# Patient Record
Sex: FEMALE | Race: WHITE | NOT HISPANIC OR LATINO | Employment: FULL TIME | URBAN - METROPOLITAN AREA
[De-identification: names, ages, dates, MRNs, and addresses within clinical notes are randomized per-mention and may not be internally consistent; named-entity substitution may affect disease eponyms.]

---

## 2022-05-25 LAB
ABO, EXTERNAL RESULT: NORMAL
HEP B, EXTERNAL RESULT: NEGATIVE
HEPATITIS C ANTIBODY, EXTERNAL RESULT: NEGATIVE
HIV, EXTERNAL RESULT: NON REACTIVE
RH FACTOR, EXTERNAL RESULT: POSITIVE
RPR, EXTERNAL RESULT: NON REACTIVE
RUBELLA TITER, EXTERNAL RESULT: NORMAL

## 2022-06-20 LAB
C. TRACHOMATIS, EXTERNAL RESULT: NOT DETECTED
N. GONORRHOEAE, EXTERNAL RESULT: NOT DETECTED

## 2022-08-23 ENCOUNTER — HOSPITAL ENCOUNTER (EMERGENCY)
Facility: HOSPITAL | Age: 27
Discharge: HOME | End: 2022-08-23
Attending: EMERGENCY MEDICINE | Admitting: EMERGENCY MEDICINE

## 2022-08-23 ENCOUNTER — HOSPITAL ENCOUNTER (OUTPATIENT)
Facility: HOSPITAL | Age: 27
End: 2022-08-23
Attending: OBSTETRICS & GYNECOLOGY | Admitting: OBSTETRICS & GYNECOLOGY

## 2022-08-23 ENCOUNTER — APPOINTMENT (OUTPATIENT)
Dept: ULTRASOUND IMAGING | Facility: HOSPITAL | Age: 27
End: 2022-08-23

## 2022-08-23 ENCOUNTER — HOSPITAL ENCOUNTER (OUTPATIENT)
Facility: HOSPITAL | Age: 27
Discharge: HOME | End: 2022-08-23
Attending: OBSTETRICS & GYNECOLOGY | Admitting: OBSTETRICS & GYNECOLOGY

## 2022-08-23 ENCOUNTER — HOSPITAL ENCOUNTER (EMERGENCY)
Facility: HOSPITAL | Age: 27
End: 2022-08-23

## 2022-08-23 VITALS
DIASTOLIC BLOOD PRESSURE: 71 MMHG | OXYGEN SATURATION: 97 % | SYSTOLIC BLOOD PRESSURE: 112 MMHG | BODY MASS INDEX: 23.22 KG/M2 | HEART RATE: 106 BPM | WEIGHT: 123 LBS | RESPIRATION RATE: 18 BRPM | HEIGHT: 61 IN | TEMPERATURE: 98 F

## 2022-08-23 VITALS
OXYGEN SATURATION: 98 % | HEART RATE: 108 BPM | BODY MASS INDEX: 23.35 KG/M2 | WEIGHT: 123.68 LBS | TEMPERATURE: 98.1 F | HEIGHT: 61 IN | RESPIRATION RATE: 20 BRPM

## 2022-08-23 VITALS
TEMPERATURE: 97.8 F | DIASTOLIC BLOOD PRESSURE: 75 MMHG | SYSTOLIC BLOOD PRESSURE: 129 MMHG | RESPIRATION RATE: 20 BRPM | OXYGEN SATURATION: 99 % | HEART RATE: 101 BPM

## 2022-08-23 DIAGNOSIS — R11.2 NAUSEA AND VOMITING, UNSPECIFIED VOMITING TYPE: ICD-10-CM

## 2022-08-23 DIAGNOSIS — N39.0 URINARY TRACT INFECTION WITHOUT HEMATURIA, SITE UNSPECIFIED: ICD-10-CM

## 2022-08-23 DIAGNOSIS — R51.9 ACUTE INTRACTABLE HEADACHE, UNSPECIFIED HEADACHE TYPE: ICD-10-CM

## 2022-08-23 DIAGNOSIS — Z3A.21 21 WEEKS GESTATION OF PREGNANCY: Primary | ICD-10-CM

## 2022-08-23 LAB
ALBUMIN SERPL-MCNC: 3.2 G/DL (ref 3.5–5.2)
ALBUMIN/GLOB SERPL: 1.1 G/DL
ALP SERPL-CCNC: 81 U/L (ref 39–117)
ALT SERPL W P-5'-P-CCNC: 6 U/L (ref 1–33)
ANION GAP SERPL CALCULATED.3IONS-SCNC: 9 MMOL/L (ref 5–15)
AST SERPL-CCNC: 11 U/L (ref 1–32)
BACTERIA UR QL AUTO: ABNORMAL /HPF
BASOPHILS # BLD AUTO: 0 10*3/MM3 (ref 0–0.2)
BASOPHILS NFR BLD AUTO: 0.3 % (ref 0–1.5)
BILIRUB SERPL-MCNC: 0.2 MG/DL (ref 0–1.2)
BILIRUB UR QL STRIP: NEGATIVE
BUN SERPL-MCNC: 13 MG/DL (ref 6–20)
BUN/CREAT SERPL: 21 (ref 7–25)
CALCIUM SPEC-SCNC: 8.5 MG/DL (ref 8.6–10.5)
CHLORIDE SERPL-SCNC: 98 MMOL/L (ref 98–107)
CLARITY UR: ABNORMAL
CO2 SERPL-SCNC: 22 MMOL/L (ref 22–29)
COLOR UR: YELLOW
CREAT SERPL-MCNC: 0.62 MG/DL (ref 0.57–1)
D-LACTATE SERPL-SCNC: 0.6 MMOL/L (ref 0.5–2)
DEPRECATED RDW RBC AUTO: 42 FL (ref 37–54)
EGFRCR SERPLBLD CKD-EPI 2021: 124.6 ML/MIN/1.73
EOSINOPHIL # BLD AUTO: 0.1 10*3/MM3 (ref 0–0.4)
EOSINOPHIL NFR BLD AUTO: 0.7 % (ref 0.3–6.2)
ERYTHROCYTE [DISTWIDTH] IN BLOOD BY AUTOMATED COUNT: 16.3 % (ref 12.3–15.4)
GLOBULIN UR ELPH-MCNC: 2.8 GM/DL
GLUCOSE BLDC GLUCOMTR-MCNC: 229 MG/DL (ref 70–105)
GLUCOSE SERPL-MCNC: 248 MG/DL (ref 65–99)
GLUCOSE UR STRIP-MCNC: ABNORMAL MG/DL
HCT VFR BLD AUTO: 24.6 % (ref 34–46.6)
HGB BLD-MCNC: 7.7 G/DL (ref 12–15.9)
HGB UR QL STRIP.AUTO: NEGATIVE
HYALINE CASTS UR QL AUTO: ABNORMAL /LPF
KETONES UR QL STRIP: ABNORMAL
LEUKOCYTE ESTERASE UR QL STRIP.AUTO: ABNORMAL
LYMPHOCYTES # BLD AUTO: 1.1 10*3/MM3 (ref 0.7–3.1)
LYMPHOCYTES NFR BLD AUTO: 9.7 % (ref 19.6–45.3)
MCH RBC QN AUTO: 23.1 PG (ref 26.6–33)
MCHC RBC AUTO-ENTMCNC: 31.1 G/DL (ref 31.5–35.7)
MCV RBC AUTO: 74.1 FL (ref 79–97)
MONOCYTES # BLD AUTO: 1.1 10*3/MM3 (ref 0.1–0.9)
MONOCYTES NFR BLD AUTO: 10.3 % (ref 5–12)
NEUTROPHILS NFR BLD AUTO: 79 % (ref 42.7–76)
NEUTROPHILS NFR BLD AUTO: 8.8 10*3/MM3 (ref 1.7–7)
NITRITE UR QL STRIP: NEGATIVE
NRBC BLD AUTO-RTO: 0.1 /100 WBC (ref 0–0.2)
PH UR STRIP.AUTO: 6.5 [PH] (ref 5–8)
PLATELET # BLD AUTO: 274 10*3/MM3 (ref 140–450)
PMV BLD AUTO: 7.8 FL (ref 6–12)
POTASSIUM SERPL-SCNC: 4.7 MMOL/L (ref 3.5–5.2)
PROT SERPL-MCNC: 6 G/DL (ref 6–8.5)
PROT UR QL STRIP: ABNORMAL
RBC # BLD AUTO: 3.32 10*6/MM3 (ref 3.77–5.28)
RBC # UR STRIP: ABNORMAL /HPF
REF LAB TEST METHOD: ABNORMAL
SARS-COV-2 RNA PNL SPEC NAA+PROBE: NOT DETECTED
SODIUM SERPL-SCNC: 129 MMOL/L (ref 136–145)
SP GR UR STRIP: 1.02 (ref 1–1.03)
SQUAMOUS #/AREA URNS HPF: ABNORMAL /HPF
UROBILINOGEN UR QL STRIP: ABNORMAL
WBC # UR STRIP: ABNORMAL /HPF
WBC NRBC COR # BLD: 11.1 10*3/MM3 (ref 3.4–10.8)

## 2022-08-23 PROCEDURE — 87635 SARS-COV-2 COVID-19 AMP PRB: CPT | Performed by: EMERGENCY MEDICINE

## 2022-08-23 PROCEDURE — 99284 EMERGENCY DEPT VISIT MOD MDM: CPT

## 2022-08-23 PROCEDURE — 87040 BLOOD CULTURE FOR BACTERIA: CPT | Performed by: EMERGENCY MEDICINE

## 2022-08-23 PROCEDURE — 36415 COLL VENOUS BLD VENIPUNCTURE: CPT | Performed by: EMERGENCY MEDICINE

## 2022-08-23 PROCEDURE — 25010000002 CEFTRIAXONE PER 250 MG: Performed by: EMERGENCY MEDICINE

## 2022-08-23 PROCEDURE — 76775 US EXAM ABDO BACK WALL LIM: CPT

## 2022-08-23 PROCEDURE — 25010000002 PROCHLORPERAZINE 10 MG/2ML SOLUTION: Performed by: EMERGENCY MEDICINE

## 2022-08-23 PROCEDURE — 85025 COMPLETE CBC W/AUTO DIFF WBC: CPT | Performed by: EMERGENCY MEDICINE

## 2022-08-23 PROCEDURE — 81001 URINALYSIS AUTO W/SCOPE: CPT | Performed by: EMERGENCY MEDICINE

## 2022-08-23 PROCEDURE — 96365 THER/PROPH/DIAG IV INF INIT: CPT

## 2022-08-23 PROCEDURE — 80053 COMPREHEN METABOLIC PANEL: CPT | Performed by: EMERGENCY MEDICINE

## 2022-08-23 PROCEDURE — 83605 ASSAY OF LACTIC ACID: CPT

## 2022-08-23 PROCEDURE — 25010000002 DIPHENHYDRAMINE PER 50 MG: Performed by: EMERGENCY MEDICINE

## 2022-08-23 PROCEDURE — 82962 GLUCOSE BLOOD TEST: CPT

## 2022-08-23 PROCEDURE — 87086 URINE CULTURE/COLONY COUNT: CPT | Performed by: EMERGENCY MEDICINE

## 2022-08-23 PROCEDURE — 96375 TX/PRO/DX INJ NEW DRUG ADDON: CPT

## 2022-08-23 PROCEDURE — 99283 EMERGENCY DEPT VISIT LOW MDM: CPT

## 2022-08-23 PROCEDURE — G0463 HOSPITAL OUTPT CLINIC VISIT: HCPCS

## 2022-08-23 RX ORDER — PROCHLORPERAZINE EDISYLATE 5 MG/ML
10 INJECTION INTRAMUSCULAR; INTRAVENOUS ONCE
Status: COMPLETED | OUTPATIENT
Start: 2022-08-23 | End: 2022-08-23

## 2022-08-23 RX ORDER — CEFDINIR 300 MG/1
300 CAPSULE ORAL 2 TIMES DAILY
Qty: 14 CAPSULE | Refills: 0 | Status: SHIPPED | OUTPATIENT
Start: 2022-08-23

## 2022-08-23 RX ORDER — DIPHENHYDRAMINE HYDROCHLORIDE 50 MG/ML
25 INJECTION INTRAMUSCULAR; INTRAVENOUS ONCE
Status: COMPLETED | OUTPATIENT
Start: 2022-08-23 | End: 2022-08-23

## 2022-08-23 RX ORDER — ONDANSETRON 4 MG/1
4 TABLET, FILM COATED ORAL 4 TIMES DAILY
Qty: 10 TABLET | Refills: 0 | Status: SHIPPED | OUTPATIENT
Start: 2022-08-23

## 2022-08-23 RX ADMIN — CEFTRIAXONE 1 G: 1 INJECTION, POWDER, FOR SOLUTION INTRAMUSCULAR; INTRAVENOUS at 18:42

## 2022-08-23 RX ADMIN — SODIUM CHLORIDE, POTASSIUM CHLORIDE, SODIUM LACTATE AND CALCIUM CHLORIDE 1000 ML: 600; 310; 30; 20 INJECTION, SOLUTION INTRAVENOUS at 15:24

## 2022-08-23 RX ADMIN — PROCHLORPERAZINE EDISYLATE 10 MG: 5 INJECTION INTRAMUSCULAR; INTRAVENOUS at 15:19

## 2022-08-23 RX ADMIN — DIPHENHYDRAMINE HYDROCHLORIDE 25 MG: 50 INJECTION, SOLUTION INTRAMUSCULAR; INTRAVENOUS at 15:17

## 2022-08-23 NOTE — DISCHARGE INSTRUCTIONS
Follow-up with your obstetrician call in a.m., may use Tylenol for aches or fever.  Small frequent sips of fluids and bites of food.  Rest tonight, return new or worsening symptoms

## 2022-08-23 NOTE — NURSING NOTE
"Patient presents at 21/3 weeks gestation with c/o HA, right lower abd pain and lower back pain. Upon admission patient jumped onto stretcher face down and RN noticed patient was \"twitching\" on and off. Pt states she has been having these issues x 4-5 days and also a fever of 101 yesterday. Admits she is a type 1 diabetic and uses a dexcon. Accucheck using hospital glucometer showed BS of 229. FHT's 144 per doppler. No vag bleeding or LOF . /75, . MD notified and is on way up to unit to give orders . Will cont to monitor   "

## 2022-08-23 NOTE — ED PROVIDER NOTES
Subjective   History of Present Illness  19-year-old female presents with complaints of not feeling well for 5 days.  She states she has had migraine.  She has had some stomach and back pain.  She reports she had temperature 101.5 yesterday.  She has had nausea vomiting throughout this time.  She states she has not vomited since yesterday.  She has had no cough or congestion.  She has had some body aches.  She does not complain of urinary difficulty.  She has had no vaginal bleeding.  She is currently 21 weeks pregnant.  She was initially seen by labor and delivery cleared from their standpoint.  Review of Systems   Constitutional: Positive for fever. Negative for unexpected weight change.   HENT: Negative for congestion and sore throat.    Eyes: Negative for pain and visual disturbance.   Respiratory: Negative for cough and shortness of breath.    Cardiovascular: Negative for chest pain and leg swelling.   Gastrointestinal: Positive for abdominal pain, nausea and vomiting. Negative for diarrhea.   Genitourinary: Negative for dysuria, urgency, vaginal bleeding and vaginal discharge.   Musculoskeletal: Positive for back pain.   Skin: Negative for rash.   Neurological: Positive for headaches. Negative for dizziness and weakness.   Psychiatric/Behavioral: Negative for confusion.       No past medical history on file.  Insulin-dependent diabetes  Allergies   Allergen Reactions   • Amitriptyline Hives   • Other Other (See Comments)     Pneumonia vaccine. Causes swelling     • Reglan [Metoclopramide] Anxiety   • Toradol [Ketorolac Tromethamine] Rash       No past surgical history on file.    No family history on file.    Social History     Socioeconomic History   • Marital status:      Prior to Admission medications    Not on File     Insulin, prenatal vitamins    Objective   Physical Exam  19-year-old female awake alert.  Generally well-developed well-nourished.  Pupils equal round at light.  Oropharynx mucous  membranes moist neck supple chest clear equal breath sounds.  Cardiovascular regular in rhythm abdomen is soft with gravid uterus.  She complains of right CVA tenderness.  Extremities without tenderness edema.  Neurologic exam cranial nerves II through XII grossly intact motor sensory intact to extremities without deficit.  Speech clear skin without rash noted.  Procedures           ED Course      Results for orders placed or performed during the hospital encounter of 08/23/22   COVID-19,CEPHEID/ALMA,COR/MALLIKA/PAD/MIKE IN-HOUSE(OR EMERGENT/ADD-ON),NP SWAB IN TRANSPORT MEDIA 3-4 HR TAT, RT-PCR - Swab, Nasopharynx    Specimen: Nasopharynx; Swab   Result Value Ref Range    COVID19 Not Detected Not Detected - Ref. Range   Comprehensive Metabolic Panel    Specimen: Blood   Result Value Ref Range    Glucose 248 (H) 65 - 99 mg/dL    BUN 13 6 - 20 mg/dL    Creatinine 0.62 0.57 - 1.00 mg/dL    Sodium 129 (L) 136 - 145 mmol/L    Potassium 4.7 3.5 - 5.2 mmol/L    Chloride 98 98 - 107 mmol/L    CO2 22.0 22.0 - 29.0 mmol/L    Calcium 8.5 (L) 8.6 - 10.5 mg/dL    Total Protein 6.0 6.0 - 8.5 g/dL    Albumin 3.20 (L) 3.50 - 5.20 g/dL    ALT (SGPT) 6 1 - 33 U/L    AST (SGOT) 11 1 - 32 U/L    Alkaline Phosphatase 81 39 - 117 U/L    Total Bilirubin 0.2 0.0 - 1.2 mg/dL    Globulin 2.8 gm/dL    A/G Ratio 1.1 g/dL    BUN/Creatinine Ratio 21.0 7.0 - 25.0    Anion Gap 9.0 5.0 - 15.0 mmol/L    eGFR 124.6 >60.0 mL/min/1.73   Urinalysis With Culture If Indicated - Urine, Clean Catch    Specimen: Urine, Clean Catch   Result Value Ref Range    Color, UA Yellow Yellow, Straw    Appearance, UA Cloudy (A) Clear    pH, UA 6.5 5.0 - 8.0    Specific Gravity, UA 1.023 1.005 - 1.030    Glucose, UA >=1000 mg/dL (3+) (A) Negative    Ketones, UA 80 mg/dL (3+) (A) Negative    Bilirubin, UA Negative Negative    Blood, UA Negative Negative    Protein, UA Trace (A) Negative    Leuk Esterase, UA Moderate (2+) (A) Negative    Nitrite, UA Negative Negative     Urobilinogen, UA 1.0 E.U./dL 0.2 - 1.0 E.U./dL   CBC Auto Differential    Specimen: Blood   Result Value Ref Range    WBC 11.10 (H) 3.40 - 10.80 10*3/mm3    RBC 3.32 (L) 3.77 - 5.28 10*6/mm3    Hemoglobin 7.7 (L) 12.0 - 15.9 g/dL    Hematocrit 24.6 (L) 34.0 - 46.6 %    MCV 74.1 (L) 79.0 - 97.0 fL    MCH 23.1 (L) 26.6 - 33.0 pg    MCHC 31.1 (L) 31.5 - 35.7 g/dL    RDW 16.3 (H) 12.3 - 15.4 %    RDW-SD 42.0 37.0 - 54.0 fl    MPV 7.8 6.0 - 12.0 fL    Platelets 274 140 - 450 10*3/mm3    Neutrophil % 79.0 (H) 42.7 - 76.0 %    Lymphocyte % 9.7 (L) 19.6 - 45.3 %    Monocyte % 10.3 5.0 - 12.0 %    Eosinophil % 0.7 0.3 - 6.2 %    Basophil % 0.3 0.0 - 1.5 %    Neutrophils, Absolute 8.80 (H) 1.70 - 7.00 10*3/mm3    Lymphocytes, Absolute 1.10 0.70 - 3.10 10*3/mm3    Monocytes, Absolute 1.10 (H) 0.10 - 0.90 10*3/mm3    Eosinophils, Absolute 0.10 0.00 - 0.40 10*3/mm3    Basophils, Absolute 0.00 0.00 - 0.20 10*3/mm3    nRBC 0.1 0.0 - 0.2 /100 WBC   Urinalysis, Microscopic Only - Urine, Clean Catch    Specimen: Urine, Clean Catch   Result Value Ref Range    RBC, UA 0-2 (A) None Seen /HPF    WBC, UA 6-12 (A) None Seen /HPF    Bacteria, UA None Seen None Seen /HPF    Squamous Epithelial Cells, UA 3-6 (A) None Seen, 0-2 /HPF    Hyaline Casts, UA None Seen None Seen /LPF    Methodology Manual Light Microscopy    POC Lactate    Specimen: Blood   Result Value Ref Range    Lactate 0.6 0.5 - 2.0 mmol/L     US Renal Bilateral    Result Date: 8/23/2022    1. Morphologically normal kidneys with no evidence of urinary tract obstruction. 2.  Cholelithiasis.  No sonographic evidence of acute cholecystitis.  Electronically Signed By-Eduard Ly MD On:8/23/2022 6:02 PM This report was finalized on [id number] by  Eduard Ly MD.    Medications   cefTRIAXone (ROCEPHIN) 1 g in sodium chloride 0.9 % 100 mL IVPB (has no administration in time range)   lactated ringers bolus 1,000 mL (1,000 mL Intravenous New Bag 8/23/22 3407)   diphenhydrAMINE  "(BENADRYL) injection 25 mg (25 mg Intravenous Given 8/23/22 1517)   prochlorperazine (COMPAZINE) injection 10 mg (10 mg Intravenous Given 8/23/22 1519)     BP 97/59   Pulse 110   Temp 98 °F (36.7 °C) (Oral)   Resp 18   Ht 154.9 cm (61\")   Wt 55.8 kg (123 lb)   SpO2 99%   BMI 23.24 kg/m²                                        MDM  Chart review: No previous records  Comorbidity: As per past history  Differential: UTI, COVID, viral illness, renal colic, migraine  My EKG interpretation:   Lab: COVID-19 not detected lactic acid normal white count 11.1 with hemoglobin 7.7 platelet count 274 79 segs no bands urinalysis reveals 6-12 white cells no bacteria 80 mg/dL ketones.  This was sent for culture.  Comprehensive metabolic panel glucose 248 with normal BUN and creatinine and sodium 129.  Radiology: Ultrasound of kidneys were obtained.  Notes of hydronephrosis or obstructive neuropathy.  There was cholelithiasis with no evidence of cholecystitis.  Discussion/treatment: Patient received a liter of IV fluids.  Was given Compazine and Benadryl.  She had improvement in her symptoms with treatment.  She was given dose of Rocephin.  Findings were discussed with her and family.  She was discharged placed on Omnicef.  Advised to follow-up with her obstetrician call in the morning.  Return new or worsening symptoms  Patient was evaluated using appropriate PPE      Final diagnoses:   21 weeks gestation of pregnancy   Acute intractable headache, unspecified headache type   Nausea and vomiting, unspecified vomiting type   Urinary tract infection without hematuria, site unspecified       ED Disposition  ED Disposition     ED Disposition   Discharge    Condition   Stable    Comment   --             No follow-up provider specified.       Medication List      New Prescriptions    cefdinir 300 MG capsule  Commonly known as: OMNICEF  Take 1 capsule by mouth 2 (Two) Times a Day.     ondansetron 4 MG tablet  Commonly known as: " Zofran  Take 1 tablet by mouth 4 (Four) Times a Day. Prn nausea           Where to Get Your Medications      These medications were sent to [facility]/pharmacy #[id number] - [place], [place] - [address] [facility] - [phone]  - [phone]  [address] [place] [address] [place] [address]    Hours: 24-hours Phone: [phone]   · cefdinir 300 MG capsule  · ondansetron 4 MG tablet          Bebo Jackson MD  08/23/22 8427

## 2022-08-24 ENCOUNTER — HOSPITAL ENCOUNTER (EMERGENCY)
Facility: HOSPITAL | Age: 27
Discharge: HOME | End: 2022-08-24
Attending: EMERGENCY MEDICINE | Admitting: EMERGENCY MEDICINE

## 2022-08-24 VITALS
RESPIRATION RATE: 22 BRPM | DIASTOLIC BLOOD PRESSURE: 84 MMHG | OXYGEN SATURATION: 98 % | HEIGHT: 61 IN | WEIGHT: 123 LBS | BODY MASS INDEX: 23.22 KG/M2 | HEART RATE: 95 BPM | TEMPERATURE: 99.2 F | SYSTOLIC BLOOD PRESSURE: 121 MMHG

## 2022-08-24 DIAGNOSIS — R73.9 HYPERGLYCEMIA: Primary | ICD-10-CM

## 2022-08-24 DIAGNOSIS — N12 PYELONEPHRITIS: ICD-10-CM

## 2022-08-24 DIAGNOSIS — R10.9 ABDOMINAL CRAMPING: ICD-10-CM

## 2022-08-24 LAB
ACETONE BLD QL: NEGATIVE
ALBUMIN SERPL-MCNC: 3.1 G/DL (ref 3.5–5.2)
ALBUMIN/GLOB SERPL: 0.9 G/DL
ALP SERPL-CCNC: 91 U/L (ref 39–117)
ALT SERPL W P-5'-P-CCNC: 6 U/L (ref 1–33)
AMPHET+METHAMPHET UR QL: NEGATIVE
AMPHETAMINES UR QL: NEGATIVE
ANION GAP SERPL CALCULATED.3IONS-SCNC: 6.3 MMOL/L (ref 5–15)
AST SERPL-CCNC: 12 U/L (ref 1–32)
BACTERIA SPEC AEROBE CULT: NO GROWTH
BACTERIA UR QL AUTO: ABNORMAL /HPF
BARBITURATES UR QL SCN: NEGATIVE
BASOPHILS # BLD AUTO: 0.01 10*3/MM3 (ref 0–0.2)
BASOPHILS NFR BLD AUTO: 0.1 % (ref 0–1.5)
BENZODIAZ UR QL SCN: NEGATIVE
BILIRUB SERPL-MCNC: 0.2 MG/DL (ref 0–1.2)
BILIRUB UR QL STRIP: NEGATIVE
BUN SERPL-MCNC: 14 MG/DL (ref 6–20)
BUN/CREAT SERPL: 21.2 (ref 7–25)
BUPRENORPHINE SERPL-MCNC: NEGATIVE NG/ML
CALCIUM SPEC-SCNC: 8.8 MG/DL (ref 8.6–10.5)
CANNABINOIDS SERPL QL: POSITIVE
CHLORIDE SERPL-SCNC: 102 MMOL/L (ref 98–107)
CLARITY UR: CLEAR
CO2 SERPL-SCNC: 25.7 MMOL/L (ref 22–29)
COCAINE UR QL: NEGATIVE
COLOR UR: YELLOW
CREAT SERPL-MCNC: 0.66 MG/DL (ref 0.57–1)
DEPRECATED RDW RBC AUTO: 42.5 FL (ref 37–54)
EGFRCR SERPLBLD CKD-EPI 2021: 122.7 ML/MIN/1.73
EOSINOPHIL # BLD AUTO: 0.12 10*3/MM3 (ref 0–0.4)
EOSINOPHIL NFR BLD AUTO: 1.7 % (ref 0.3–6.2)
ERYTHROCYTE [DISTWIDTH] IN BLOOD BY AUTOMATED COUNT: 15.7 % (ref 12.3–15.4)
GLOBULIN UR ELPH-MCNC: 3.3 GM/DL
GLUCOSE BLDC GLUCOMTR-MCNC: 213 MG/DL (ref 70–130)
GLUCOSE SERPL-MCNC: 198 MG/DL (ref 65–99)
GLUCOSE UR STRIP-MCNC: ABNORMAL MG/DL
HCT VFR BLD AUTO: 24.5 % (ref 34–46.6)
HGB BLD-MCNC: 7.8 G/DL (ref 12–15.9)
HGB UR QL STRIP.AUTO: NEGATIVE
HYALINE CASTS UR QL AUTO: ABNORMAL /LPF
IMM GRANULOCYTES # BLD AUTO: 0.04 10*3/MM3 (ref 0–0.05)
IMM GRANULOCYTES NFR BLD AUTO: 0.6 % (ref 0–0.5)
KETONES UR QL STRIP: NEGATIVE
LEUKOCYTE ESTERASE UR QL STRIP.AUTO: ABNORMAL
LYMPHOCYTES # BLD AUTO: 1.64 10*3/MM3 (ref 0.7–3.1)
LYMPHOCYTES NFR BLD AUTO: 23.2 % (ref 19.6–45.3)
MCH RBC QN AUTO: 24.1 PG (ref 26.6–33)
MCHC RBC AUTO-ENTMCNC: 31.8 G/DL (ref 31.5–35.7)
MCV RBC AUTO: 75.9 FL (ref 79–97)
METHADONE UR QL SCN: NEGATIVE
MONOCYTES # BLD AUTO: 0.86 10*3/MM3 (ref 0.1–0.9)
MONOCYTES NFR BLD AUTO: 12.2 % (ref 5–12)
NEUTROPHILS NFR BLD AUTO: 4.39 10*3/MM3 (ref 1.7–7)
NEUTROPHILS NFR BLD AUTO: 62.2 % (ref 42.7–76)
NITRITE UR QL STRIP: NEGATIVE
NRBC BLD AUTO-RTO: 0 /100 WBC (ref 0–0.2)
OPIATES UR QL: POSITIVE
OXYCODONE UR QL SCN: NEGATIVE
PCP UR QL SCN: NEGATIVE
PH UR STRIP.AUTO: 5.5 [PH] (ref 4.5–8)
PLATELET # BLD AUTO: 300 10*3/MM3 (ref 140–450)
PMV BLD AUTO: 9.7 FL (ref 6–12)
POTASSIUM SERPL-SCNC: 5 MMOL/L (ref 3.5–5.2)
PROPOXYPH UR QL: NEGATIVE
PROT SERPL-MCNC: 6.4 G/DL (ref 6–8.5)
PROT UR QL STRIP: NEGATIVE
RBC # BLD AUTO: 3.23 10*6/MM3 (ref 3.77–5.28)
RBC # UR STRIP: ABNORMAL /HPF
REF LAB TEST METHOD: ABNORMAL
SODIUM SERPL-SCNC: 134 MMOL/L (ref 136–145)
SP GR UR STRIP: 1.01 (ref 1–1.03)
SQUAMOUS #/AREA URNS HPF: ABNORMAL /HPF
TRICYCLICS UR QL SCN: NEGATIVE
UROBILINOGEN UR QL STRIP: ABNORMAL
WBC # UR STRIP: ABNORMAL /HPF
WBC CLUMPS # UR AUTO: ABNORMAL /HPF
WBC NRBC COR # BLD: 7.06 10*3/MM3 (ref 3.4–10.8)

## 2022-08-24 PROCEDURE — 85025 COMPLETE CBC W/AUTO DIFF WBC: CPT | Performed by: EMERGENCY MEDICINE

## 2022-08-24 PROCEDURE — 99283 EMERGENCY DEPT VISIT LOW MDM: CPT

## 2022-08-24 PROCEDURE — 82009 KETONE BODYS QUAL: CPT | Performed by: EMERGENCY MEDICINE

## 2022-08-24 PROCEDURE — 80306 DRUG TEST PRSMV INSTRMNT: CPT | Performed by: EMERGENCY MEDICINE

## 2022-08-24 PROCEDURE — 81001 URINALYSIS AUTO W/SCOPE: CPT | Performed by: EMERGENCY MEDICINE

## 2022-08-24 PROCEDURE — 80053 COMPREHEN METABOLIC PANEL: CPT | Performed by: EMERGENCY MEDICINE

## 2022-08-24 PROCEDURE — 82962 GLUCOSE BLOOD TEST: CPT

## 2022-08-24 PROCEDURE — 36415 COLL VENOUS BLD VENIPUNCTURE: CPT

## 2022-08-24 RX ORDER — INSULIN LISPRO 100 [IU]/ML
INJECTION, SOLUTION INTRAVENOUS; SUBCUTANEOUS
COMMUNITY

## 2022-08-24 RX ORDER — PRENATAL VIT NO.126/IRON/FOLIC 28MG-0.8MG
TABLET ORAL DAILY
COMMUNITY

## 2022-08-24 RX ADMIN — SODIUM CHLORIDE 1000 ML: 9 INJECTION, SOLUTION INTRAVENOUS at 17:22

## 2022-08-24 NOTE — DISCHARGE INSTRUCTIONS
Drink plenty of fluids.  Take your antibiotics as prescribed.  Follow-up with your primary care provider and OB/GYN this week.  Return to the emergency department if there is vomiting not controlled with your Zofran, fever, worse in any way at all.  Patient to go to labor and delivery for further evaluation.

## 2022-08-24 NOTE — ED PROVIDER NOTES
Subjective   History of Present Illness  History of Present Illness    Chief complaint: Elevated blood sugar    Location: Noted at home    Quality/Severity: 300    Timing/Onset/Duration: Noted today    Modifying Factors: No modifying factor    Associated Symptoms: No headache.  No fever chills or cough.  No sore throat earache or nasal congestion.  No chest pain or shortness of breath.  No abdominal pain.  No diarrhea or burning when she urinates.  No vaginal bleeding or discharge.    Narrative: This 27-year-old white female presents with elevated blood sugar for the last week.  She is 21 weeks pregnant.  The patient has insulin-dependent diabetes.  The patient lives in [place].  She was seen at [facility] yesterday for pyelonephritis.  She had an ultrasound that showed no obstructive uropathy.  She was placed on an antibiotic.  She called her OB doctor today with the elevated blood sugar and her OB doctor told her to come to the emergency department.      PCP:    OB/GYN: Lilly Garcia  Review of Systems   Constitutional: Positive for fever (Yesterday). Negative for chills.   HENT: Negative for congestion, ear pain and sore throat.    Respiratory: Positive for shortness of breath.    Cardiovascular: Negative for chest pain.   Gastrointestinal: Negative for abdominal pain, diarrhea, nausea and vomiting.   Musculoskeletal: Negative for back pain.   Skin: Negative for rash.   Neurological: Negative for headaches.        Medication List      ASK your doctor about these medications    cefdinir 300 MG capsule  Commonly known as: OMNICEF  Take 1 capsule by mouth 2 (Two) Times a Day.     insulin detemir 100 UNIT/ML injection  Commonly known as: LEVEMIR     Insulin Lispro 100 UNIT/ML injection  Commonly known as: humaLOG     ondansetron 4 MG tablet  Commonly known as: Zofran  Take 1 tablet by mouth 4 (Four) Times a Day. Prn nausea     prenatal (CLASSIC) vitamin  tablet  Generic drug: prenatal vitamin            Past  Medical History:   Diagnosis Date   • Diabetes mellitus (HCC)    • Gastroparesis        Allergies   Allergen Reactions   • Amitriptyline Hives   • Other Other (See Comments)     Pneumonia vaccine. Causes swelling     • Reglan [Metoclopramide] Anxiety   • Toradol [Ketorolac Tromethamine] Rash       Past Surgical History:   Procedure Laterality Date   •  SECTION     • GTUBE REPLACEMENT         History reviewed. No pertinent family history.    Social History     Socioeconomic History   • Marital status:    Tobacco Use   • Smoking status: Never Smoker   Substance and Sexual Activity   • Alcohol use: Not Currently   • Drug use: Not Currently           Objective   Physical Exam  Vitals (The temperature is 99.2 °F, pulse 105, respirations 20, /80, room air pulse ox 97%.) and nursing note reviewed.   Constitutional:       Appearance: Normal appearance.   HENT:      Head: Normocephalic and atraumatic.      Right Ear: Tympanic membrane normal.      Left Ear: Tympanic membrane normal.      Nose: Nose normal.      Mouth/Throat:      Mouth: Mucous membranes are moist.   Cardiovascular:      Rate and Rhythm: Normal rate and regular rhythm.      Pulses: Normal pulses.      Heart sounds: Normal heart sounds. No murmur heard.    No friction rub. No gallop.   Pulmonary:      Effort: Pulmonary effort is normal.      Breath sounds: Normal breath sounds.   Abdominal:      General: There is no distension.      Palpations: There is no mass.      Tenderness: There is no abdominal tenderness. There is no guarding or rebound.      Hernia: No hernia is present.      Comments: Gravid   Musculoskeletal:         General: Normal range of motion.      Cervical back: Normal range of motion and neck supple.   Skin:     General: Skin is warm and dry.      Findings: No rash.   Neurological:      General: No focal deficit present.      Mental Status: She is alert and oriented to person, place, and time.         Procedures            ED Course  ED Course as of 08/24/22 1830   Wed Aug 24, 2022   1733 The white blood cell count is 7.  The hemoglobin 7.8 and hematocrit 24.  The CBC is otherwise unremarkable.     [RC]   1733 The hemoglobin was 7.7 on 8/23/2022. [RC]   1746 Your blood glucose is 198..  Sodium is 134.  The albumin is 3.1.  The CMP is otherwise unremarkable. [RC]   1817 The urine microscopic shows no red blood cells, 13-20 white blood cells, trace bacteria.  The squamous cells are 1320.  The leukocytes are small. [RC]   1826 The urine drug screen is positive for opiates and THC. [RC]      ED Course User Index  [RC] Cliff Martinez MD    17:34 EDT, 08/24/22:  The patient's blood glucose is 190 on her Dexcom sensor and her fetal heart tones are 160..       18:30 EDT, 08/24/22: The patient was reassessed.  She is requesting something for abdominal cramps she states that she was given pain medication in the ER yesterday.  Denies any recreational use of opiates.  Her urine drug screen is also positive for marijuana.  Recheck of her blood sugar was 201.  She denies any vaginal bleeding or discharges.  The patient is tolerating p.o. fluids.  She has antibiotics at home to take.  The plan will be to have her continue her antibiotics.    18:37 EDT, 08/24/22:  I spoke with Dr. Ballesteros, on-call for OB/GYN, they will evaluate the patient in labor and delivery.                                    MDM    Final diagnoses:   Hyperglycemia   Abdominal cramping   Pyelonephritis       ED Disposition  ED Disposition     None          No follow-up provider specified.       Medication List      No changes were made to your prescriptions during this visit.          Cliff Martinez MD  08/24/22 8385

## 2022-08-24 NOTE — ED NOTES
Pt reports hyperglycemia. Pt reports nausea and abd cramping intermittently since this AM. Pt is 21 weeks pregnant with her second child. Pt is extremely restless in the bed, pt stated it is related to the abd cramping.    Ambulatory

## 2022-08-28 LAB
BACTERIA SPEC AEROBE CULT: NORMAL
BACTERIA SPEC AEROBE CULT: NORMAL

## 2022-10-14 ENCOUNTER — HOSPITAL ENCOUNTER (OUTPATIENT)
Age: 27
Discharge: LEFT BEFORE DISCHARGE | End: 2022-10-14
Attending: OBSTETRICS & GYNECOLOGY | Admitting: OBSTETRICS & GYNECOLOGY
Payer: MEDICAID

## 2022-10-14 LAB
A/G RATIO: 1.1 (ref 1.1–2.2)
ALBUMIN SERPL-MCNC: 3.5 G/DL (ref 3.4–5)
ALP BLD-CCNC: 80 U/L (ref 40–129)
ALT SERPL-CCNC: 13 U/L (ref 10–40)
AMPHETAMINE SCREEN, URINE: ABNORMAL
ANION GAP SERPL CALCULATED.3IONS-SCNC: 15 MMOL/L (ref 3–16)
AST SERPL-CCNC: 19 U/L (ref 15–37)
BARBITURATE SCREEN URINE: ABNORMAL
BASOPHILS ABSOLUTE: 0 K/UL (ref 0–0.2)
BASOPHILS RELATIVE PERCENT: 0.3 %
BENZODIAZEPINE SCREEN, URINE: ABNORMAL
BILIRUB SERPL-MCNC: 0.3 MG/DL (ref 0–1)
BILIRUBIN URINE: NEGATIVE
BLOOD, URINE: NEGATIVE
BUN BLDV-MCNC: 13 MG/DL (ref 7–20)
BUPRENORPHINE URINE: ABNORMAL
CALCIUM SERPL-MCNC: 8.6 MG/DL (ref 8.3–10.6)
CANNABINOID SCREEN URINE: POSITIVE
CHLORIDE BLD-SCNC: 97 MMOL/L (ref 99–110)
CLARITY: CLEAR
CO2: 22 MMOL/L (ref 21–32)
COCAINE METABOLITE SCREEN URINE: ABNORMAL
COLOR: YELLOW
CREAT SERPL-MCNC: 0.7 MG/DL (ref 0.6–1.1)
EOSINOPHILS ABSOLUTE: 0.1 K/UL (ref 0–0.6)
EOSINOPHILS RELATIVE PERCENT: 0.8 %
FENTANYL SCREEN, URINE: ABNORMAL
GFR AFRICAN AMERICAN: >60
GFR NON-AFRICAN AMERICAN: >60
GLUCOSE BLD-MCNC: 205 MG/DL (ref 70–99)
GLUCOSE BLD-MCNC: 209 MG/DL (ref 70–99)
GLUCOSE BLD-MCNC: 241 MG/DL (ref 70–99)
GLUCOSE URINE: >=1000 MG/DL
HCT VFR BLD CALC: 27.6 % (ref 36–48)
HEMOGLOBIN: 8.6 G/DL (ref 12–16)
KETONES, URINE: ABNORMAL MG/DL
LEUKOCYTE ESTERASE, URINE: NEGATIVE
LYMPHOCYTES ABSOLUTE: 1.9 K/UL (ref 1–5.1)
LYMPHOCYTES RELATIVE PERCENT: 20.5 %
Lab: ABNORMAL
MCH RBC QN AUTO: 22.8 PG (ref 26–34)
MCHC RBC AUTO-ENTMCNC: 31.3 G/DL (ref 31–36)
MCV RBC AUTO: 72.8 FL (ref 80–100)
METHADONE SCREEN, URINE: ABNORMAL
MICROSCOPIC EXAMINATION: ABNORMAL
MONOCYTES ABSOLUTE: 0.8 K/UL (ref 0–1.3)
MONOCYTES RELATIVE PERCENT: 9 %
NEUTROPHILS ABSOLUTE: 6.4 K/UL (ref 1.7–7.7)
NEUTROPHILS RELATIVE PERCENT: 69.4 %
NITRITE, URINE: NEGATIVE
OPIATE SCREEN URINE: ABNORMAL
OXYCODONE URINE: ABNORMAL
PDW BLD-RTO: 16.8 % (ref 12.4–15.4)
PERFORMED ON: ABNORMAL
PERFORMED ON: ABNORMAL
PH UA: 7
PH UA: 7 (ref 5–8)
PHENCYCLIDINE SCREEN URINE: ABNORMAL
PLATELET # BLD: 296 K/UL (ref 135–450)
PMV BLD AUTO: 7.8 FL (ref 5–10.5)
POTASSIUM SERPL-SCNC: 4.9 MMOL/L (ref 3.5–5.1)
PROTEIN UA: NEGATIVE MG/DL
RBC # BLD: 3.79 M/UL (ref 4–5.2)
SODIUM BLD-SCNC: 134 MMOL/L (ref 136–145)
SPECIFIC GRAVITY UA: 1.01 (ref 1–1.03)
TOTAL PROTEIN: 6.7 G/DL (ref 6.4–8.2)
URINE TYPE: ABNORMAL
UROBILINOGEN, URINE: 1 E.U./DL
WBC # BLD: 9.2 K/UL (ref 4–11)

## 2022-10-14 PROCEDURE — 81003 URINALYSIS AUTO W/O SCOPE: CPT

## 2022-10-14 PROCEDURE — 80053 COMPREHEN METABOLIC PANEL: CPT

## 2022-10-14 PROCEDURE — 85025 COMPLETE CBC W/AUTO DIFF WBC: CPT

## 2022-10-14 PROCEDURE — 80307 DRUG TEST PRSMV CHEM ANLYZR: CPT

## 2022-10-14 PROCEDURE — 99211 OFF/OP EST MAY X REQ PHY/QHP: CPT

## 2022-10-15 VITALS
HEART RATE: 121 BPM | WEIGHT: 124.3 LBS | TEMPERATURE: 98.5 F | OXYGEN SATURATION: 100 % | DIASTOLIC BLOOD PRESSURE: 69 MMHG | HEIGHT: 61 IN | BODY MASS INDEX: 23.47 KG/M2 | SYSTOLIC BLOOD PRESSURE: 105 MMHG | RESPIRATION RATE: 16 BRPM

## 2022-10-15 NOTE — H&P
Department of Obstetrics and Gynecology  Labor and Delivery  Attending Triage Note      SUBJECTIVE:    29 y/o  @ 28.6 wks. By 8 wk , [facility] 22 presents c/o generalized weakness & SOB for the past few days. Pt states - \"I think I am anemic and that is why I am weak. \" Denies medications were rx'd for anemia. Reports difficulty to get up, get dressed, and do ADL's. Denies VB, LOF, or contractions. +FM. Pt reports she is getting care in [facility] - in [address] w/ spouse due to his job & returning home in 5 days. Patient reports her BS's are controlled w/ MFM in [place], [facility] sliding scale of humalog 1-8 U w/ meals and Levimir 6U bid. Reports recently ate soup & milkshake @ 20 pm  & took 8U humalog w/ meal. Reports BS have been low. Pt reports she weighed 110 lbs @ onset of pregnancy, now weighs 124.3 lbs. Denies dizziness, SOB, palpitations, HA, vision changes, RUQ or shoulder pain. Denies N/V/D/C or urinary complaints. Preg c/b (per ACOG)Type 1 DM, suicide attempt, h/o c/s, h/o drug abuse, +tobacco usage (obtained from ACOG), UTI in preg, Sickle Cell Trait    OB Hx -   OB-1 10/17/2018, female, 4lbs 9oz, 36 wks. , PLTCS 2/2 fetal distress, denies other complications w/ pregnancy other than Type I DM  OB - 2 current preg     Med Hx - Type I DM, gastroparesis    Soc Hx - pt denies alcohol or drug usage & denies smoking, however her ACOG indicated h/o drug abuse, tobacco usage    Allergies - toradol     Surg hx - PLTCS, Hernia Repair      OBJECTIVE    Vitals:    Vitals:    10/14/22 2142 10/14/22 2215 10/14/22 2247 10/14/22 2252   BP:  111/74     Pulse:  91     Temp: 98.5 °F (36.9 °C)      SpO2:   100% 100%   Weight: 122 lb (55.3 kg)      Height: 5' 1\" (1.549 m)         CONSTITUTIONAL:  awake, alert, cooperative, no apparent distress, and appears stated age  LUNGS:  No increased work of breathing, good air exchange, clear to auscultation bilaterally, no crackles or wheezing  CARDIOVASCULAR:  Normal apical impulse, regular rate and rhythm,  ABDOMEN: Gravid, no scars, normal bowel sounds, soft, non-distended, non-tender, no masses palpated  EXT: No C/C/E        Fetal Position:  unsure    Membranes:  Intact    Fetal heart rate:         Baseline Heart Rate:  140's        Accelerations:  present       Decelerations:  absent       Variability:  moderate    Contraction frequency: 0 minutes    Component Ref Range & Units 10/14/22 2248    WBC 4.0 - 11.0 K/uL 9.2    RBC 4.00 - 5.20 M/uL 3.79 Low     Hemoglobin 12.0 - 16.0 g/dL 8.6 Low     Hematocrit 36.0 - 48.0 % 27.6 Low     MCV 80.0 - 100.0 fL 72.8 Low     MCH 26.0 - 34.0 pg 22.8 Low     MCHC 31.0 - 36.0 g/dL 31.3    RDW 12.4 - 15.4 % 16.8 High     Platelets 426 - 782 K/uL 296    MPV 5.0 - 10.5 fL 7.8    Neutrophils % % 69.4    Lymphocytes % % 20.5    Monocytes % % 9.0    Eosinophils % % 0.8    Basophils % % 0.3    Neutrophils Absolute 1.7 - 7.7 K/uL 6.4    Lymphocytes Absolute 1.0 - 5.1 K/uL 1.9    Monocytes Absolute 0.0 - 1.3 K/uL 0.8    Eosinophils Absolute 0.0 - 0.6 K/uL 0.1    Basophils Absolute 0.0 - 0.2 K/uL 0.0    Resulting Agency  [address] [facility]              Specimen Collected: 10/14/22 22:48 EDT Last Resulted: 10/14/22 22:58 EDT           Component Ref Range & Units 10/14/22 2220    Amphetamine Screen, Urine Negative <1000ng/mL Neg    Barbiturate Screen, Ur Negative <200 ng/mL Neg    Benzodiazepine Screen, Urine Negative <200 ng/mL Neg    Cannabinoid Scrn, Ur Negative <50 ng/mL POSITIVE Abnormal     Cocaine Metabolite Screen, Urine Negative <300 ng/mL Neg    Opiate Scrn, Ur Negative <300 ng/mL Neg    Comment: \"Therapeutic levels of pain medication, especially oxycontin and synthetic   opioids, may not be detected by this Methodology. Pain management screen   panel  Drug panel-PM-Hi Res Ur, Interp (PAIN) should be considered for drug   monitoring \".     PCP Screen, Urine Negative <25 ng/mL Neg    Methadone Screen, Urine Negative <300 ng/mL Neg    Oxycodone Urine Negative <100 ng/ml Neg    Buprenorphine Urine Negative <5 ng/ml Neg    pH, UA  7.0    Comment: Urine pH less than 5.0 or greater than 8.0 may indicate sample adulteration. Another sample should be collected if clinically   indicated. Drug Screen Comment:  see below    Comment: This method is a screening test to detect only these drug   classes as part of a medical workup. Confirmatory testing   by another method should be ordered if clinically indicated. FENTANYL SCREEN, URINE Negative <5 ng/mL Neg    Resulting [facility]              Specimen Collected: 10/14/22 22:20 EDT Last Resulted: 10/14/22 22:58 EDT           Component Ref Range & Units 10/14/22 2248    Sodium 136 - 145 mmol/L 134 Low     Potassium 3.5 - 5.1 mmol/L 4.9    Chloride 99 - 110 mmol/L 97 Low     CO2 21 - 32 mmol/L 22    Anion Gap 3 - 16 15    Glucose 70 - 99 mg/dL 241 High     BUN 7 - 20 mg/dL 13    Creatinine 0.6 - 1.1 mg/dL 0.7    GFR Non- >60 >60    Comment: >60 mL/min/1.73m2 EGFR, calc. for ages 25 and older using the   MDRD formula (not corrected for weight), is valid for stable   renal function. GFR  >60 >60    Comment: Chronic Kidney Disease: less than 60 ml/min/1.73 sq.m. Kidney Failure: less than 15 ml/min/1.73 sq.m. Results valid for patients 18 years and older.     Calcium 8.3 - 10.6 mg/dL 8.6    Total Protein 6.4 - 8.2 g/dL 6.7    Albumin 3.4 - 5.0 g/dL 3.5    Albumin/Globulin Ratio 1.1 - 2.2 1.1    Total Bilirubin 0.0 - 1.0 mg/dL 0.3    Alkaline Phosphatase 40 - 129 U/L 80    ALT 10 - 40 U/L 13    AST 15 - 37 U/L 19    Resulting [facility]              Specimen Collected: 10/14/22 22:48 EDT Last Resulted: 10/14/22 23:13 EDT              Component Ref Range & Units 10/14/22 2311 10/14/22 2202   POC Glucose 70 - 99 mg/dl 209 High   205 High     Performed on  ACCU-CHEK  ACCU-CHEK Resulting Agency  [facility]      Initial BS - pt had just had a milkshake & w/ 2nd FS BS - pt was eating a geolad bar. Component Ref Range & Units 10/14/22 2220 10/14/22 2220   Color, UA Straw/Yellow Yellow     Clarity, UA Clear Clear     Glucose, Ur Negative mg/dL >=1000 Abnormal      Bilirubin Urine Negative Negative     Ketones, Urine Negative mg/dL TRACE Abnormal      Specific Gravity, UA 1.005 - 1.030 1.010     Blood, Urine Negative Negative     pH, UA 5.0 - 8.0 7.0  7.0 R, CM    Protein, UA Negative mg/dL Negative     Urobilinogen, Urine <2.0 E.U./dL 1.0     Nitrite, Urine Negative Negative     Leukocyte Esterase, Urine Negative Negative     Microscopic Examination  Not Indicated     Urine Type  NotGiven         PNL  22 H/H - 9.2/29.3  HBsag - negative  RPR-NR  Rubella - Immune  O positive/ Antibody - negative  CBC 12.7/9.2/29.3/370  Toxassure UDS - +methamphetamine  HbA1C - 7.3%  HepCAb - negative  VZV antibody - Immune  HIV - negative        ASSESSMENT & PLAN:      27 y/o  @ 28.6wks., [facility] 22 w/ weakness  1)Type I DM - d/w pt. Taking insulin to treat BS & pt refused. She stated \"I know my sugar will be normal in a couple of hours. \" Encouraged pt to eat regular meals. Informed pt awaiting the remainder of labs and then will re-assess. Fetus - tracing reassuring  2)Anemia - encouraged iron bid along w/ daily PNV    Pt. Left AMA prior to re-assessing - RN had pt sign AMA form.

## 2022-10-15 NOTE — PROGRESS NOTES
Patient arrived to triage with chief complaint of general fatigue that has been going on for about 3 days now. Patient states that she is from out of town and has anemia and feels as if her iron is low. Patient denies contractions, vaginal bleeding, no LOF and still feels baby move at this time. Patient states that she is a type one diabetic on a humalog sliding scale with meals and 6 units of levamir BID. Reported history and present complaint to Dr. Royce Colindres. Orders given for labs at this time.

## 2022-10-30 ENCOUNTER — APPOINTMENT (OUTPATIENT)
Dept: ULTRASOUND IMAGING | Age: 27
DRG: 566 | End: 2022-10-30
Payer: MEDICAID

## 2022-10-30 ENCOUNTER — HOSPITAL ENCOUNTER (INPATIENT)
Age: 27
LOS: 1 days | Discharge: ANOTHER HOSPITAL | DRG: 566 | End: 2022-10-31
Attending: OBSTETRICS & GYNECOLOGY | Admitting: OBSTETRICS & GYNECOLOGY
Payer: MEDICAID

## 2022-10-30 VITALS
BODY MASS INDEX: 23.22 KG/M2 | RESPIRATION RATE: 16 BRPM | DIASTOLIC BLOOD PRESSURE: 50 MMHG | HEART RATE: 90 BPM | SYSTOLIC BLOOD PRESSURE: 95 MMHG | HEIGHT: 61 IN | TEMPERATURE: 97.9 F | WEIGHT: 123 LBS

## 2022-10-30 PROBLEM — O98.513 COVID-19 AFFECTING PREGNANCY IN THIRD TRIMESTER: Status: ACTIVE | Noted: 2022-10-30

## 2022-10-30 PROBLEM — U07.1 COVID-19 AFFECTING PREGNANCY IN THIRD TRIMESTER: Status: ACTIVE | Noted: 2022-10-30

## 2022-10-30 PROBLEM — O36.8120: Status: ACTIVE | Noted: 2022-10-30

## 2022-10-30 LAB
A/G RATIO: 1 (ref 1.1–2.2)
ALBUMIN SERPL-MCNC: 3.3 G/DL (ref 3.4–5)
ALP BLD-CCNC: 92 U/L (ref 40–129)
ALT SERPL-CCNC: 9 U/L (ref 10–40)
AMPHETAMINE SCREEN, URINE: ABNORMAL
ANION GAP SERPL CALCULATED.3IONS-SCNC: 11 MMOL/L (ref 3–16)
AST SERPL-CCNC: 19 U/L (ref 15–37)
BACTERIA: ABNORMAL /HPF
BARBITURATE SCREEN URINE: ABNORMAL
BENZODIAZEPINE SCREEN, URINE: ABNORMAL
BILIRUB SERPL-MCNC: <0.2 MG/DL (ref 0–1)
BILIRUBIN URINE: NEGATIVE
BLOOD, URINE: NEGATIVE
BUN BLDV-MCNC: 20 MG/DL (ref 7–20)
BUPRENORPHINE URINE: ABNORMAL
CALCIUM SERPL-MCNC: 8.5 MG/DL (ref 8.3–10.6)
CANNABINOID SCREEN URINE: POSITIVE
CHLORIDE BLD-SCNC: 103 MMOL/L (ref 99–110)
CLARITY: CLEAR
CO2: 23 MMOL/L (ref 21–32)
COCAINE METABOLITE SCREEN URINE: ABNORMAL
COLOR: YELLOW
CREAT SERPL-MCNC: 0.8 MG/DL (ref 0.6–1.1)
EPITHELIAL CELLS, UA: ABNORMAL /HPF (ref 0–5)
FENTANYL SCREEN, URINE: ABNORMAL
GFR SERPL CREATININE-BSD FRML MDRD: >60 ML/MIN/{1.73_M2}
GLUCOSE BLD-MCNC: 103 MG/DL (ref 70–99)
GLUCOSE BLD-MCNC: 109 MG/DL (ref 70–99)
GLUCOSE BLD-MCNC: 124 MG/DL (ref 70–99)
GLUCOSE BLD-MCNC: 127 MG/DL (ref 70–99)
GLUCOSE BLD-MCNC: 165 MG/DL (ref 70–99)
GLUCOSE BLD-MCNC: 166 MG/DL (ref 70–99)
GLUCOSE BLD-MCNC: 189 MG/DL (ref 70–99)
GLUCOSE BLD-MCNC: 208 MG/DL (ref 70–99)
GLUCOSE BLD-MCNC: 300 MG/DL (ref 70–99)
GLUCOSE BLD-MCNC: 61 MG/DL (ref 70–99)
GLUCOSE BLD-MCNC: 68 MG/DL (ref 70–99)
GLUCOSE BLD-MCNC: 75 MG/DL (ref 70–99)
GLUCOSE BLD-MCNC: 89 MG/DL (ref 70–99)
GLUCOSE URINE: NEGATIVE MG/DL
HCT VFR BLD CALC: 22.9 % (ref 36–48)
HEMOGLOBIN: 7.2 G/DL (ref 12–16)
INFLUENZA A: NOT DETECTED
INFLUENZA B: NOT DETECTED
KETONES, URINE: NEGATIVE MG/DL
LEUKOCYTE ESTERASE, URINE: NEGATIVE
Lab: ABNORMAL
MCH RBC QN AUTO: 22.3 PG (ref 26–34)
MCHC RBC AUTO-ENTMCNC: 31.3 G/DL (ref 31–36)
MCV RBC AUTO: 71.1 FL (ref 80–100)
METHADONE SCREEN, URINE: ABNORMAL
MICROSCOPIC EXAMINATION: YES
NITRITE, URINE: NEGATIVE
OPIATE SCREEN URINE: ABNORMAL
OXYCODONE URINE: ABNORMAL
PDW BLD-RTO: 17.8 % (ref 12.4–15.4)
PERFORMED ON: ABNORMAL
PERFORMED ON: NORMAL
PERFORMED ON: NORMAL
PH UA: 6
PH UA: 6 (ref 5–8)
PHENCYCLIDINE SCREEN URINE: ABNORMAL
PLATELET # BLD: 259 K/UL (ref 135–450)
PMV BLD AUTO: 8.3 FL (ref 5–10.5)
POTASSIUM SERPL-SCNC: 4.4 MMOL/L (ref 3.5–5.1)
PROTEIN UA: ABNORMAL MG/DL
RBC # BLD: 3.22 M/UL (ref 4–5.2)
RBC UA: ABNORMAL /HPF (ref 0–4)
SARS-COV-2 RNA, RT PCR: DETECTED
SODIUM BLD-SCNC: 137 MMOL/L (ref 136–145)
SPECIFIC GRAVITY UA: 1.02 (ref 1–1.03)
TOTAL PROTEIN: 6.5 G/DL (ref 6.4–8.2)
URINE TYPE: ABNORMAL
UROBILINOGEN, URINE: 0.2 E.U./DL
WBC # BLD: 4.3 K/UL (ref 4–11)
WBC UA: ABNORMAL /HPF (ref 0–5)

## 2022-10-30 PROCEDURE — 1220000000 HC SEMI PRIVATE OB R&B

## 2022-10-30 PROCEDURE — 76816 OB US FOLLOW-UP PER FETUS: CPT

## 2022-10-30 PROCEDURE — 85027 COMPLETE CBC AUTOMATED: CPT

## 2022-10-30 PROCEDURE — 80307 DRUG TEST PRSMV CHEM ANLYZR: CPT

## 2022-10-30 PROCEDURE — 80053 COMPREHEN METABOLIC PANEL: CPT

## 2022-10-30 PROCEDURE — 2500000003 HC RX 250 WO HCPCS: Performed by: OBSTETRICS & GYNECOLOGY

## 2022-10-30 PROCEDURE — 2580000003 HC RX 258: Performed by: OBSTETRICS & GYNECOLOGY

## 2022-10-30 PROCEDURE — 6370000000 HC RX 637 (ALT 250 FOR IP): Performed by: OBSTETRICS & GYNECOLOGY

## 2022-10-30 PROCEDURE — 87636 SARSCOV2 & INF A&B AMP PRB: CPT

## 2022-10-30 PROCEDURE — 81001 URINALYSIS AUTO W/SCOPE: CPT

## 2022-10-30 PROCEDURE — 83036 HEMOGLOBIN GLYCOSYLATED A1C: CPT

## 2022-10-30 PROCEDURE — 6360000002 HC RX W HCPCS: Performed by: OBSTETRICS & GYNECOLOGY

## 2022-10-30 PROCEDURE — 76819 FETAL BIOPHYS PROFIL W/O NST: CPT

## 2022-10-30 RX ORDER — SODIUM CHLORIDE 9 MG/ML
INJECTION, SOLUTION INTRAVENOUS PRN
Status: DISCONTINUED | OUTPATIENT
Start: 2022-10-30 | End: 2022-10-31 | Stop reason: HOSPADM

## 2022-10-30 RX ORDER — SODIUM CHLORIDE 0.9 % (FLUSH) 0.9 %
5-40 SYRINGE (ML) INJECTION EVERY 12 HOURS SCHEDULED
Status: DISCONTINUED | OUTPATIENT
Start: 2022-10-30 | End: 2022-10-31 | Stop reason: HOSPADM

## 2022-10-30 RX ORDER — PRENATAL WITH FERROUS FUM AND FOLIC ACID 3080; 920; 120; 400; 22; 1.84; 3; 20; 10; 1; 12; 200; 27; 25; 2 [IU]/1; [IU]/1; MG/1; [IU]/1; MG/1; MG/1; MG/1; MG/1; MG/1; MG/1; UG/1; MG/1; MG/1; MG/1; MG/1
1 TABLET ORAL DAILY
Status: DISCONTINUED | OUTPATIENT
Start: 2022-10-30 | End: 2022-10-31 | Stop reason: HOSPADM

## 2022-10-30 RX ORDER — FERROUS SULFATE 325(65) MG
325 TABLET ORAL 2 TIMES DAILY WITH MEALS
Status: DISCONTINUED | OUTPATIENT
Start: 2022-10-30 | End: 2022-10-31 | Stop reason: HOSPADM

## 2022-10-30 RX ORDER — INSULIN LISPRO 100 [IU]/ML
0-4 INJECTION, SOLUTION INTRAVENOUS; SUBCUTANEOUS PRN
Status: DISCONTINUED | OUTPATIENT
Start: 2022-10-30 | End: 2022-10-31 | Stop reason: HOSPADM

## 2022-10-30 RX ORDER — INSULIN LISPRO 100 [IU]/ML
INJECTION, SOLUTION INTRAVENOUS; SUBCUTANEOUS
COMMUNITY
Start: 2022-09-28

## 2022-10-30 RX ORDER — INSULIN LISPRO 100 [IU]/ML
8 INJECTION, SOLUTION INTRAVENOUS; SUBCUTANEOUS
Status: DISCONTINUED | OUTPATIENT
Start: 2022-10-30 | End: 2022-10-31 | Stop reason: HOSPADM

## 2022-10-30 RX ORDER — SODIUM CHLORIDE 0.9 % (FLUSH) 0.9 %
5-40 SYRINGE (ML) INJECTION PRN
Status: DISCONTINUED | OUTPATIENT
Start: 2022-10-30 | End: 2022-10-31 | Stop reason: HOSPADM

## 2022-10-30 RX ORDER — DEXTROSE MONOHYDRATE 50 MG/ML
100 INJECTION, SOLUTION INTRAVENOUS PRN
Status: DISCONTINUED | OUTPATIENT
Start: 2022-10-30 | End: 2022-10-31 | Stop reason: HOSPADM

## 2022-10-30 RX ORDER — OXYCODONE HYDROCHLORIDE 5 MG/1
5 TABLET ORAL EVERY 8 HOURS PRN
Status: DISCONTINUED | OUTPATIENT
Start: 2022-10-30 | End: 2022-10-31 | Stop reason: HOSPADM

## 2022-10-30 RX ORDER — DEXTROSE AND SODIUM CHLORIDE 5; .45 G/100ML; G/100ML
INJECTION, SOLUTION INTRAVENOUS CONTINUOUS
Status: DISCONTINUED | OUTPATIENT
Start: 2022-10-30 | End: 2022-10-31 | Stop reason: HOSPADM

## 2022-10-30 RX ORDER — ONDANSETRON 2 MG/ML
4 INJECTION INTRAMUSCULAR; INTRAVENOUS EVERY 6 HOURS PRN
Status: DISCONTINUED | OUTPATIENT
Start: 2022-10-30 | End: 2022-10-31 | Stop reason: HOSPADM

## 2022-10-30 RX ORDER — ACETAMINOPHEN 500 MG
1000 TABLET ORAL EVERY 6 HOURS PRN
Status: DISCONTINUED | OUTPATIENT
Start: 2022-10-30 | End: 2022-10-31 | Stop reason: HOSPADM

## 2022-10-30 RX ORDER — INSULIN LISPRO 100 [IU]/ML
11 INJECTION, SOLUTION INTRAVENOUS; SUBCUTANEOUS
Status: DISCONTINUED | OUTPATIENT
Start: 2022-10-31 | End: 2022-10-31 | Stop reason: HOSPADM

## 2022-10-30 RX ORDER — FAMOTIDINE 10 MG/ML
20 INJECTION, SOLUTION INTRAVENOUS 2 TIMES DAILY
Status: DISCONTINUED | OUTPATIENT
Start: 2022-10-30 | End: 2022-10-31 | Stop reason: HOSPADM

## 2022-10-30 RX ORDER — INSULIN LISPRO 100 [IU]/ML
INJECTION, SOLUTION INTRAVENOUS; SUBCUTANEOUS
COMMUNITY

## 2022-10-30 RX ORDER — LANOLIN ALCOHOL/MO/W.PET/CERES
325 CREAM (GRAM) TOPICAL 2 TIMES DAILY
COMMUNITY

## 2022-10-30 RX ADMIN — INSULIN LISPRO 4 UNITS: 100 INJECTION, SOLUTION INTRAVENOUS; SUBCUTANEOUS at 15:04

## 2022-10-30 RX ADMIN — ACETAMINOPHEN 1000 MG: 500 TABLET ORAL at 18:29

## 2022-10-30 RX ADMIN — INSULIN LISPRO 3 UNITS: 100 INJECTION, SOLUTION INTRAVENOUS; SUBCUTANEOUS at 15:56

## 2022-10-30 RX ADMIN — Medication 16 G: at 18:53

## 2022-10-30 RX ADMIN — INSULIN HUMAN 2 UNITS: 100 INJECTION, SOLUTION PARENTERAL at 13:52

## 2022-10-30 RX ADMIN — DEXTROSE AND SODIUM CHLORIDE: 5; 450 INJECTION, SOLUTION INTRAVENOUS at 09:08

## 2022-10-30 RX ADMIN — OXYCODONE 5 MG: 5 TABLET ORAL at 23:31

## 2022-10-30 RX ADMIN — METFORMIN HYDROCHLORIDE 1 TABLET: 500 TABLET, EXTENDED RELEASE ORAL at 15:03

## 2022-10-30 RX ADMIN — INSULIN LISPRO 4 UNITS: 100 INJECTION, SOLUTION INTRAVENOUS; SUBCUTANEOUS at 13:51

## 2022-10-30 RX ADMIN — OXYCODONE 5 MG: 5 TABLET ORAL at 15:02

## 2022-10-30 RX ADMIN — INSULIN LISPRO 8 UNITS: 100 INJECTION, SOLUTION INTRAVENOUS; SUBCUTANEOUS at 17:40

## 2022-10-30 RX ADMIN — ONDANSETRON 4 MG: 2 INJECTION INTRAMUSCULAR; INTRAVENOUS at 11:05

## 2022-10-30 RX ADMIN — SODIUM CHLORIDE 2.5 UNITS/HR: 9 INJECTION, SOLUTION INTRAVENOUS at 12:47

## 2022-10-30 RX ADMIN — ACETAMINOPHEN 1000 MG: 500 TABLET ORAL at 11:05

## 2022-10-30 RX ADMIN — DEXTROSE AND SODIUM CHLORIDE: 5; 450 INJECTION, SOLUTION INTRAVENOUS at 21:28

## 2022-10-30 RX ADMIN — INSULIN LISPRO 1 UNITS: 100 INJECTION, SOLUTION INTRAVENOUS; SUBCUTANEOUS at 17:01

## 2022-10-30 RX ADMIN — FAMOTIDINE 20 MG: 10 INJECTION, SOLUTION INTRAVENOUS at 15:50

## 2022-10-30 RX ADMIN — FERROUS SULFATE TAB 325 MG (65 MG ELEMENTAL FE) 325 MG: 325 (65 FE) TAB at 18:28

## 2022-10-30 ASSESSMENT — PAIN SCALES - GENERAL
PAINLEVEL_OUTOF10: 8
PAINLEVEL_OUTOF10: 8
PAINLEVEL_OUTOF10: 3
PAINLEVEL_OUTOF10: 7

## 2022-10-30 ASSESSMENT — PAIN - FUNCTIONAL ASSESSMENT: PAIN_FUNCTIONAL_ASSESSMENT: ACTIVITIES ARE NOT PREVENTED

## 2022-10-30 NOTE — PROGRESS NOTES
Spoke with Dr Nilda Rodriguez (house officer and DOM) at this time. Updated on pt arrival to triage, current complaints, clinical presentation, recent visit to triage where Dr Nilda Rodriguez had seen the pt, and pertinent history. Dr Nilda Rodriguez ordering a covid/flu swab, as well as CBC, CMP, UDS, and urinalysis. RN to wait on drawing labs (other than covid/flu) until after DO has seen pt as she might want to order additional labs. DO to come out to see pt shortly.

## 2022-10-30 NOTE — PROGRESS NOTES
S: Pt still c/o pain on left side. Sitting up in bed & eating - sweet potato, pudding, & grilled cheese. Pt denies LOF, VB or contractions. +FM. O:  Vitals:    10/30/22 0648 10/30/22 0728 10/30/22 1203   BP: (!) 81/57 100/65 116/85   Pulse: (!) 118 (!) 107 88   Resp: 18  16   Temp: 98.6 °F (37 °C)  97.8 °F (36.6 °C)   TempSrc: Oral  Oral   Weight: 123 lb (55.8 kg)     Height: 5' 1\" (1.549 m)     Abd:gravid, nt, ND, +BS       Component Ref Range & Units 10/30/22 1317 10/30/22 1159 10/30/22 1017 10/30/22 0813 10/14/22 2311 10/14/22 2202   POC Glucose 70 - 99 mg/dl 300 High   208 High   127 High   109 High   209 High   205 High     Performed on  [address]  [address] [facility] [address] [place] [address] Lab [facility]              Specimen Collected: 10/30/22 13:17 EDT Last Resulted: 10/30/22 13:19 EDT              Component Ref Range & Units 10/30/22 0830 10/14/22 2248   Sodium 136 - 145 mmol/L 137  134 Low     Potassium 3.5 - 5.1 mmol/L 4.4  4.9    Chloride 99 - 110 mmol/L 103  97 Low     CO2 21 - 32 mmol/L 23  22    Anion Gap 3 - 16 11  15    Glucose 70 - 99 mg/dL 103 High   241 High     BUN 7 - 20 mg/dL 20  13    Creatinine 0.6 - 1.1 mg/dL 0.8  0.7    Est, Glom Filt Rate >60 >60     Comment: Pediatric calculator link   Neha.[email or web address]. org/professionals/kdoqi/gfr_calculatorped   Effective Oct 3, 2022   These results are not intended for use in patients   <25years of age. eGFR results are calculated without   a race factor using the 2021 CKD-EPI equation. Careful   clinical correlation is recommended, particularly when   comparing to results calculated using previous equations.    The CKD-EPI equation is less accurate in patients with   extremes of muscle mass, extra-renal metabolism of   creatinine, excessive creatinine ingestion, or following   therapy that affects renal tubular secretion. Calcium 8.3 - 10.6 mg/dL 8.5  8.6    Total Protein 6.4 - 8.2 g/dL 6.5  6.7    Albumin 3.4 - 5.0 g/dL 3.3 Low   3.5    Albumin/Globulin Ratio 1.1 - 2.2 1.0 Low   1.1    Total Bilirubin 0.0 - 1.0 mg/dL <0.2  0.3    Alkaline Phosphatase 40 - 129 U/L 92  80    ALT 10 - 40 U/L 9 Low   13    AST 15 - 37 U/L 19  19    GFR Non-   >60 R, CM    GFR    >60 R, CM    Resulting Agency  [facility] [facility]              Specimen Collected: 10/30/22 08:30 EDT Last Resulted: 10/30/22 09:08 EDT           Component Ref Range & Units 10/30/22 0830 10/14/22 2248   WBC 4.0 - 11.0 K/uL 4.3  9.2    RBC 4.00 - 5.20 M/uL 3.22 Low   3.79 Low     Hemoglobin 12.0 - 16.0 g/dL 7.2 Low   8.6 Low     Hematocrit 36.0 - 48.0 % 22.9 Low   27.6 Low     MCV 80.0 - 100.0 fL 71.1 Low   72.8 Low     MCH 26.0 - 34.0 pg 22.3 Low   22.8 Low     MCHC 31.0 - 36.0 g/dL 31.3  31.3    RDW 12.4 - 15.4 % 17.8 High   16.8 High     Platelets 628 - 013 K/uL 259  296    MPV 5.0 - 10.5 fL 8.3  7.8    Neutrophils %   69.4 R    Basophils Absolute   0.0 R    Lymphocytes %   20.5 R    Monocytes %   9.0 R    Eosinophils %   0.8 R    Basophils %   0.3 R    Neutrophils Absolute   6.4 R    Lymphocytes Absolute   1.9 R    Monocytes Absolute   0.8 R    Eosinophils Absolute   0.1 R    Resulting Agency  [facility] [employer] [facility]              Specimen Collected: 10/30/22 08:30 EDT Last Resulted: 10/30/22 10:00 EDT           HbA1C  - pending  EXAMINATION:   BIOPHYSICAL PROFILE WITHOUT NON-STRESS TEST; SECOND/THIRD TRIMESTER OBSTETRIC   ULTRASOUND       10/30/2022       TECHNIQUE:   ULTRASOUND BIOPHYSICAL PROFILE WITHOUT NON-STRESS TEST; Transabdominal   second/third trimester obstetric pelvic ultrasound was performed with color   Doppler flow evaluation.        HISTORY:   ORDERING SYSTEM PROVIDED HISTORY: Decreased fetal movement TECHNOLOGIST PROVIDED HISTORY:   Reason for exam:->Decreased fetal movement       FINDINGS:   BIOPHYSICAL PROFILE:       Fetal Tone:  2 / 2       Gross Body:  2 / 2       Breathin / 2       Qualitative Fluid:  2 / 2       Biophysical Profile Score:  8 8       Fetal heart measures 141 beats per minute       OTHER FINDINGS:       Amniotic fluid index measures 18.3 cm. Cervical length is 3.3 cm. BPD measures 8.0 cm       Head circumference measures 27.5 cm       Abdominal circumference measures 26 cm       Femur length measures 5.7 cm       Estimated fetal weight is 1539 g       Calculated gestational age by ultrasound is 30 weeks 4 days    Confirmation per radiologist Dr. Rohini Leo - presentation is cephalic       Impression   Biophysical profile . Amniotic fluid index measures 18.3 cm. Fetal heart   rate measures 141 beats per minute       Estimated gestational age by ultrasound 30 weeks 4 days. Estimated fetal   weight 1539 g       RECOMMENDATIONS:   Unavailable     A/P: 29 y/o  @ 31.1 wks. W/ COVID, uncontrolled type I DM  - D/W pt importance of following CHO controlled diet - will consult dietician  - Type I DM - Insulin drip started - explained to pt POC to do insulin drip and then once BS's are controlled with start Humalog & Humalin as ordered. Explained this is different than her current dosing however the plan is to get her tightly controlled ASAP. Questions answered & pt stated understanding.  -anemia - hematology consulted , will cont.  Ferrous sulfate bid & daily PNV  -Pain - roxicodone prn  w/ Tylenol scheduled  -Fetus - tracing reassuring

## 2022-10-30 NOTE — PROGRESS NOTES
Order per Dr. Jensen Camarillo: start insulin drip at 2.5u/hr and do not give any other prn insulin. Recheck blood sugar in 1 hour. Educate pt on meal choices with protein.

## 2022-10-30 NOTE — H&P
Department of Obstetrics and Gynecology  Attending Obstetrics History and Physical        CHIEF COMPLAINT:  LUQ & LLQ pain    HISTORY OF PRESENT ILLNESS:      Subjective:  29 y/o  @ 31.1 wks. By 8 wk US, [facility] 22 presents c/o left sided UQ & LQ and left back pain. +Vomiting, last episode this am. BS was 142 this am per patient. Hasn't been eating well for past 3 days. +night sweats. +constipation. Last BM was 1 day ago. Denies fevers, however unable hasn't checked temperature. Denies chills, pain w/ urinating or blood in urine. Denies dizziness, SOB, palpitations, LOC, HA, vision changes or RUQ pain. Pt was seen in triage on 10/14/22 two weeks ago for weakness, had some blood work drawn & then left AMA. History obtained from ACOG & patient. Pt is a Type I diabetic. Here w/ FOB who is working in [place]. Pt states she doesn't want to be here & goes back to [place] q 2 weeks. Reports last visit w/ OB provider was around 10/19/22. Patient reports her BS's are controlled w/ MFM in [facility], [address] [place] [address] [place] sliding scale of humalog 1-8 U w/ meals based on carbohydrate intake and Levimir 6U in AM & 6 U qhs.   H/O diabetic gastroparesis - reports pain feels different. Poor historian. Preg c/b (per ACOG)Type 1 DM, suicide attempt, h/o c/s, h/o drug abuse, +tobacco usage (obtained from ACOG), UTI in preg, Sickle Cell Trait carrier, silent carrier for alpha thalassemia(aa/a-)     OB Hx -   OB-1 10/17/2018, female, 4lbs 9oz, 36 wks. , PLTCS 2/2 fetal distress, denies other complications w/ pregnancy other than Type I DM - reports FOB's Aunt twice removed has custody of child  OB - 2 current preg      Med Hx - Type I DM, gastroparesis, Sickle cell trait, alpha thalassemia(aa/a-)     Soc Hx - pt denies alcohol or drug usage & denies smoking, however her ACOG indicated h/o drug abuse, tobacco usage     Allergies - toradol      Surg hx - PLTCS, Hernia Repair      Past Medical History:        Diagnosis Date    Anemia 10/30/2022    Diabetes mellitus ([facility] [address]     Mental disorder     anxiety and depression    Seizures ([facility]     related to diabetes     Past Surgical History:        Procedure Laterality Date     SECTION      HERNIA REPAIR       Social History:    TOBACCO:   reports that she has never smoked. She has been exposed to tobacco smoke. She has never used smokeless tobacco.  ETOH:   reports no history of alcohol use. DRUGS:   reports no history of drug use. MARITAL STATUS:    Family History:   No family history on file. Medications Prior to Admission: PNV, iron, insulin as indicated above    Allergies: Toradol          PHYSICAL EXAM:  Vitals:    10/30/22 0648   BP: (!) 81/57   Pulse: (!) 118   Resp: 18   Temp: 98.6 °F (37 °C)   TempSrc: Oral   Weight: 123 lb (55.8 kg)   Height: 5' 1\" (1.549 m)      General appearance:  awake, alert, cooperative, no apparent distress, and appears stated age  Heart: RRR  SUNI:CTA b/l  Abd: soft, tender on right side antr & postr w/ palpation, +BS, ND, gravid  Ext: no c/c/e    Fetal heart rate:  Baseline Heart Rate 140's, Cat I tracing   SSE:  Cervix:    DILATION:  Closed  EFFACEMENT:   Long  STATION:  -3 cm  CONSISTENCY:  medium  POSITION:  posterior      Contraction frequency:  irritability    Membranes:  Intact    Accu-check BS - 109  COVID 19 - POSITIVE  Influenza A - Negative  Influenza B - Negative  CBC, CMP, UDS, urinalysis, HbA1C pending          ASSESSMENT AND PLAN:  1 [facility] y/o  @ 31. 1. wks. with right sided pain/COVID 19 positive/uncontrolled Type I DM    -will admit for management of BS's, IV hydration, diet monitoring, follow up on pending labs;  Accucheck 109 - will check BS's 7 times/day & treat accordingly   -fetus - Growth Scan w/ bpp ordered  -no signs of labor - cx closed/thick/high

## 2022-10-30 NOTE — PROGRESS NOTES
RN to pt bedside at this time. Pt states that she hasn't really felt her baby move in several days- that she had to stimulate her belly yesterday to get him to move and \"he only moved twice and he is usually very active. \" Pt also states that she has been throwing up for 3 days and has left upper quadrant and left lower quadrant abdominal pain that also started 3 days ago that rates an 8 on a 0-10 scale that is intermittent and sharp. When RN inquired about her blood sugars, pt states that \"they've been high and I can't get them down. \" States that she has not been able to keep anything down for 3 days. States that since she was in triage on 10/14, she has been seen by her OB. States that she sees MFM at [facility] in [place] for her diabetes.

## 2022-10-30 NOTE — PROGRESS NOTES
Notified Dr. Mary Jane Valerio of blood sugar 208 with pt wanting another applesauce. Will start insulin drip and encourage pt to order a meal with protein.

## 2022-10-31 LAB
ESTIMATED AVERAGE GLUCOSE: 174.3 MG/DL
HBA1C MFR BLD: 7.7 %

## 2022-10-31 NOTE — PROGRESS NOTES
Pt left OB unit via [facility] @ 0000 en route to [facility] L[facility] triage in stable condition and undelivered. Not in active labor.

## 2022-10-31 NOTE — PROGRESS NOTES
Per Dr. Self Minus request, confirmed with radiologist Dr. Bert Robins that fetal presentation was cephalic.

## 2022-10-31 NOTE — PROGRESS NOTES
Dr. Te Ibanez at pt bedside. After discussing options for plan of care including transfer of care to [facility], pt agreeable to be transferred. Transfer sheet signed by pt at this time.

## 2022-10-31 NOTE — PROGRESS NOTES
Spoke with pt regarding plan for transfer of care. Pt voicing concern r/t transfer and questioned why she can't be transferred to her regular OB at [facility] in [place]. I informed pt that Dr. Nilda Rodriguez would be in to see her and she can discuss her plan of care at that time.

## 2022-10-31 NOTE — PROGRESS NOTES
Call received from Dr. Kam Gavin with update that [facility] has accepted pt transfer of care.  Dr. Vanessa Ayers, Fellow, accepting care on behalf of Dr. Michelle De Paz.

## 2022-10-31 NOTE — DISCHARGE SUMMARY
Physician Discharge Summary     Patient ID:  Ariella Hartmann  [id number]  25 y.o.  1995    Admit date: 10/30/2022    Discharge date and time: 10/31/2022 12:00 AM     Admitting Physician: Yash Strauss DO     Discharge Physician: Yash Strauss DO    Admission Diagnoses: Decreased fetal movement affecting management of pregnancy in second trimester, single or unspecified fetus [O36.8120], COVID in third trimester, Type I DM    Discharge Diagnoses: Same as admission    Admission Condition: stable    Discharged Condition: stable    Indication for Admission: COVID 19, management of uncontrolled Type I DM    Hospital Course: complicated by labile BS's & COVID 10 diagnosis    [facility] [address]    Significant Diagnostic Studies: Growth Scan w/ BPP    Treatments: IV hydration and IV management of uncontrolled blood sugars    Discharge Exam:  S: Pt denies LOF, VB , or Ctx's. +FM. O:  Vitals:    10/30/22 0728 10/30/22 1203 10/30/22 1603 10/30/22 2010   BP: 100/65 116/85 (!) 108/59 (!) 95/50   Pulse: (!) 107 88 100 90   Resp:  16 16 16   Temp:  97.8 °F (36.6 °C) 97.9 °F (36.6 °C) 97.9 °F (36.6 °C)   TempSrc:  Oral Axillary Oral   Weight:       Height:        Heart: RRR  SUNI:CTA b/l  Abd: GRavid, NT,ND, +BS  Ext: no c/c/e  NST - 125, Cat. I      Disposition: to [place] [address] for management of uncontrolled Type I DM   - Spoke w/ Dr. Lambert Ybarra, [facility] Fellow and presented patient's case and request for transfer of care to [facility] patient's co-morbidities - Type I DM with uncontrolled blood sugars, gestation age of 31.1 weeks gestation, h/o diabetic gastroparesis, Sickle cell trait, silent carrier for alpha thalassemia, and COVID positive (dx'd on 10/30/22). [facility] [address] @ [address] [place] [address]. accepted patient for a transfer of care.     In process/preliminary results:  Outstanding Order Results       Date and Time Order Name Status Description    10/30/2022  8:30 AM Hemoglobin A1C In process             Patient Instructions:   Discharge Medication List as of 10/31/2022 12:12 AM        CONTINUE these medications which have NOT CHANGED    Details   ferrous sulfate (FE TABS 325) 325 (65 Fe) MG EC tablet Take 325 mg by mouth 2 times dailyHistorical Med      insulin detemir (LEVEMIR) 100 UNIT/ML injection vial Inject 6 Units into the skin 2 times dailyHistorical Med      !! insulin lispro (HUMALOG) 100 UNIT/ML SOLN injection vial 1 UNIT FOR EVERY 10 CARBSHistorical Med      !! insulin lispro (HUMALOG) 100 UNIT/ML SOLN injection vial Inject into the skin 3 times daily (before meals)Historical Med      Prenatal Vit-Fe Fumarate-FA (PRENATAL 1+1 PO) Take by mouthHistorical Med       !! - Potential duplicate medications found. Please discuss with provider.           Signed:  Francisco Kent  10/31/2022  3:01 AM

## 2022-10-31 NOTE — PROGRESS NOTES
Spoke with patient in detail about necessity to transfer to [address] For continuing care b/c of her co-morbidities - Type I DM with uncontrolled diabetes, +COVID 19, severe anemia, diabetic gastroparesis, h/o DKA, Sickle cell trait, and silent carrier for alpha thalassemia. Explained importance of transfer to a facility to manage her conditions, necessity for more skilled care @ a level 3 facility, possibility of fetal demise as well as complications w/ her diabetes for example DKA or hypoglycemia. Questions answered & pt stated understanding & pt in agreement w/ XENA to . Patient signed consents forms.

## 2022-10-31 NOTE — PROGRESS NOTES
Call placed to Dr. Johnson r/t pt's most recent BS result of 61 after pt verbalized that she felt \"low\" and was hot. This RN voiced concern r/t pt's recent unstable glucose levels as a Type 1 Diabetic with current comorbidity of Covid +. Discussed potential for pt transfer to higher level L&D r/t to current pt acuity and gestation. Dr. Johnson also informed that pt was given a snack consisting of peanut butter and katlyn crackers following result of 61. Order received to reassess pt BS again in 1 hr.

## 2022-10-31 NOTE — PROGRESS NOTES
Spoke w/ Dr. Rahul Hernandez, MFM Fellow and presented patient's case and request for transfer of care to [facility]2 patient's co-morbidities - Type I DM with uncontrolled blood sugars, gestation age of 31.1 weeks gestation, h/o diabetic gastroparesis, Sickle cell trait, silent carrier for alpha thalassemia, and COVID positive (dx'd on 10/30/22). [facility] [address] @ [address] [place] [address]. accepted patient for a transfer of care. Transfer line requested the following  - Nursing report should be reported to [phone] and a demographic sheet should be faxed to Nash Dunham @ [phone].